# Patient Record
Sex: FEMALE | Race: WHITE | Employment: UNEMPLOYED | URBAN - METROPOLITAN AREA
[De-identification: names, ages, dates, MRNs, and addresses within clinical notes are randomized per-mention and may not be internally consistent; named-entity substitution may affect disease eponyms.]

---

## 2018-09-13 ENCOUNTER — OFFICE VISIT (OUTPATIENT)
Dept: ORTHOPEDIC SURGERY | Age: 38
End: 2018-09-13

## 2018-09-13 VITALS
SYSTOLIC BLOOD PRESSURE: 104 MMHG | HEIGHT: 61 IN | DIASTOLIC BLOOD PRESSURE: 68 MMHG | HEART RATE: 61 BPM | WEIGHT: 105 LBS | BODY MASS INDEX: 19.83 KG/M2

## 2018-09-13 DIAGNOSIS — M23.92 INTERNAL DERANGEMENT OF LEFT KNEE: ICD-10-CM

## 2018-09-13 DIAGNOSIS — M25.562 ACUTE PAIN OF LEFT KNEE: Primary | ICD-10-CM

## 2018-09-13 DIAGNOSIS — M25.462 EFFUSION OF BURSA OF LEFT KNEE: ICD-10-CM

## 2018-09-13 DIAGNOSIS — M22.42 RUNNER'S KNEE, LEFT: ICD-10-CM

## 2018-09-13 PROCEDURE — 99203 OFFICE O/P NEW LOW 30 MIN: CPT | Performed by: ORTHOPAEDIC SURGERY

## 2018-09-13 ASSESSMENT — ENCOUNTER SYMPTOMS
GASTROINTESTINAL NEGATIVE: 1
BACK PAIN: 0
RESPIRATORY NEGATIVE: 1
EYES NEGATIVE: 1
ALLERGIC/IMMUNOLOGIC NEGATIVE: 1

## 2018-09-13 NOTE — LETTER
therapy. I cautioned her against running if she is swelling or limping. She will follow up with me in a month. If she has ongoing swelling at that time we'll proceed with an MRI. She agrees with this plan. All of her questions have been answered. This note was created using voice recognition software. It has been proofread, but occasionally errors remain. Please disregard these errors. They will be corrected as they are noted. If you have questions, please do not hesitate to call me. I look forward to following Matty Child along with you.     Sincerely,          Keyla Winters MD

## 2018-09-13 NOTE — PROGRESS NOTES
Subjective:      Patient ID: Corby Alonzo is a 45 y.o. female. HPI Corby Alonzo presents today for evaluation of left knee pain. She's been training for a half marathon. He began having discomfort couple of months ago. She has lots of pain with hills and squatting. Deep squatting and stairs can be more painful and running. Her pain is under the kneecap and medial.  She's had swelling. Pain can be as bad as 8 out of 10. She's not had treatment. She is otherwise healthy. 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 She has no right knee pain. Review of Systems   Constitutional: Negative. HENT: Negative. Eyes: Negative. Respiratory: Negative. Gastrointestinal: Negative. Abdominal pain: left knee pain. Endocrine: Negative. Genitourinary: Negative. Musculoskeletal: Negative for back pain. Skin: Negative. Allergic/Immunologic: Negative. Neurological: Negative. Hematological: Negative. Psychiatric/Behavioral: Negative. Objective:   Physical Exam  General Exam:    Vitals: Blood pressure 104/68, pulse 61, height 5' 0.5\" (1.537 m), weight 105 lb (47.6 kg). Constitutional: Patient is adequately groomed with no evidence of malnutrition  Mental Status: The patient is oriented to time, place and person. The patient's mood and affect are appropriate. Gait:  Patient walks with normal gait and station. Lymphatic: The lymphatic examination bilaterally reveals all areas to be without enlargement or induration. Vascular: Examination reveals no swelling or calf tenderness. Peripheral pulses are palpable and 2+. Neurological: The patient has good coordination. There is no weakness or sensory deficit. Skin:    Head/Neck: inspection reveals no rashes, ulcerations or lesions. Trunk:  inspection reveals no rashes, ulcerations or lesions. Right Lower Extremity: inspection reveals no rashes, ulcerations or lesions.   Left Lower Extremity: inspection reveals no rashes, ulcerations or

## 2018-09-14 NOTE — COMMUNICATION BODY
lesions. Examination of the bilateral hips reveals normal flexion and extension. There is no restriction in rotation. There is no tenderness to palpation anteriorly posteriorly or laterally. Right knee examination demonstrates no effusion. There is no tenderness to palpation over the medial or lateral joint line. There is no discomfort over the patellar tendon. There is no palpable popliteal cyst.  Sensation is intact. Range of motion is normal.  There is no patellofemoral crepitation. There is no instability to varus or  valgus stress applied at 0, 30, 60, or 90° of flexion. There is no anterior or posterior drawer. Lachman examination is normal.    Left knee examination demonstrates moderate to large effusion. She has tenderness over the medial joint line and pain with Greta's maneuver. She has no instability. Calf is soft. She has full motion. She is ligamentously stable. She has mild patellofemoral crepitation. X-rays were obtained today. AP standing, PA flexed, and merchant views of the bilateral knees as well as a lateral of the left knee. These demonstrate: No bony abnormalities      Assessment:      Left-sided knee effusion, medial meniscus tear, runner's knee. Plan:      We discussed options moving forward. I proposed an MRI scan versus consideration of physical therapy. She says she would like to start with therapy. I cautioned her against running if she is swelling or limping. She will follow up with me in a month. If she has ongoing swelling at that time we'll proceed with an MRI. She agrees with this plan. All of her questions have been answered. This note was created using voice recognition software. It has been proofread, but occasionally errors remain. Please disregard these errors. They will be corrected as they are noted.

## 2018-09-27 ENCOUNTER — HOSPITAL ENCOUNTER (OUTPATIENT)
Dept: PHYSICAL THERAPY | Age: 38
Setting detail: THERAPIES SERIES
Discharge: HOME | End: 2018-09-27
Payer: COMMERCIAL